# Patient Record
Sex: FEMALE | Race: WHITE | Employment: PART TIME | URBAN - METROPOLITAN AREA
[De-identification: names, ages, dates, MRNs, and addresses within clinical notes are randomized per-mention and may not be internally consistent; named-entity substitution may affect disease eponyms.]

---

## 2020-09-18 ENCOUNTER — APPOINTMENT (OUTPATIENT)
Dept: GENERAL RADIOLOGY | Age: 60
End: 2020-09-18
Payer: COMMERCIAL

## 2020-09-18 ENCOUNTER — HOSPITAL ENCOUNTER (EMERGENCY)
Age: 60
Discharge: HOME | End: 2020-09-18
Attending: EMERGENCY MEDICINE
Payer: COMMERCIAL

## 2020-09-18 VITALS
TEMPERATURE: 97.5 F | OXYGEN SATURATION: 99 % | RESPIRATION RATE: 16 BRPM | SYSTOLIC BLOOD PRESSURE: 106 MMHG | HEART RATE: 92 BPM | DIASTOLIC BLOOD PRESSURE: 77 MMHG

## 2020-09-18 PROCEDURE — 73630 X-RAY EXAM OF FOOT: CPT

## 2020-09-18 PROCEDURE — 6370000000 HC RX 637 (ALT 250 FOR IP): Performed by: EMERGENCY MEDICINE

## 2020-09-18 PROCEDURE — 99283 EMERGENCY DEPT VISIT LOW MDM: CPT

## 2020-09-18 RX ORDER — SIMVASTATIN 80 MG
80 TABLET ORAL DAILY
COMMUNITY
Start: 2019-06-19

## 2020-09-18 RX ORDER — CLINDAMYCIN HYDROCHLORIDE 300 MG/1
300 CAPSULE ORAL 4 TIMES DAILY
Qty: 28 CAPSULE | Refills: 0 | Status: SHIPPED | OUTPATIENT
Start: 2020-09-18 | End: 2020-09-25

## 2020-09-18 RX ORDER — LISINOPRIL 20 MG/1
20 TABLET ORAL DAILY
COMMUNITY
Start: 2020-06-15

## 2020-09-18 RX ORDER — CLINDAMYCIN HYDROCHLORIDE 150 MG/1
300 CAPSULE ORAL ONCE
Status: COMPLETED | OUTPATIENT
Start: 2020-09-18 | End: 2020-09-18

## 2020-09-18 RX ORDER — HYDROCHLOROTHIAZIDE 25 MG/1
25 TABLET ORAL DAILY
COMMUNITY
Start: 2019-06-18

## 2020-09-18 RX ORDER — CLOPIDOGREL BISULFATE 75 MG/1
75 TABLET ORAL DAILY
COMMUNITY
Start: 2020-06-15

## 2020-09-18 RX ORDER — CIPROFLOXACIN 500 MG/1
500 TABLET, FILM COATED ORAL 2 TIMES DAILY
Qty: 14 TABLET | Refills: 0 | Status: SHIPPED | OUTPATIENT
Start: 2020-09-18 | End: 2020-09-25

## 2020-09-18 RX ORDER — NAPROXEN 500 MG/1
500 TABLET ORAL 2 TIMES DAILY
Qty: 60 TABLET | Refills: 0 | Status: SHIPPED | OUTPATIENT
Start: 2020-09-18

## 2020-09-18 RX ORDER — ASPIRIN 81 MG/1
81 TABLET ORAL DAILY
COMMUNITY

## 2020-09-18 RX ORDER — NAPROXEN 250 MG/1
500 TABLET ORAL ONCE
Status: COMPLETED | OUTPATIENT
Start: 2020-09-18 | End: 2020-09-18

## 2020-09-18 RX ORDER — CIPROFLOXACIN 500 MG/1
500 TABLET, FILM COATED ORAL ONCE
Status: COMPLETED | OUTPATIENT
Start: 2020-09-18 | End: 2020-09-18

## 2020-09-18 RX ORDER — METOPROLOL SUCCINATE 25 MG/1
25 TABLET, EXTENDED RELEASE ORAL DAILY
COMMUNITY
Start: 2020-06-15

## 2020-09-18 RX ADMIN — CIPROFLOXACIN 500 MG: 500 TABLET, FILM COATED ORAL at 22:28

## 2020-09-18 RX ADMIN — CLINDAMYCIN HYDROCHLORIDE 300 MG: 150 CAPSULE ORAL at 22:28

## 2020-09-18 RX ADMIN — NAPROXEN 500 MG: 250 TABLET ORAL at 22:28

## 2020-09-18 ASSESSMENT — PAIN SCALES - GENERAL
PAINLEVEL_OUTOF10: 5
PAINLEVEL_OUTOF10: 5

## 2020-09-18 ASSESSMENT — ENCOUNTER SYMPTOMS: COLOR CHANGE: 1

## 2020-09-19 NOTE — ED PROVIDER NOTES
[facility]  EMERGENCY DEPARTMENTENCOUNTER      Pt Name: Willow Langley  MRN: [id number]  [place] 1960  Date ofevaluation: 9/18/2020  Provider: Jairo Perkins MD    CHIEF COMPLAINT       Chief Complaint   Patient presents with    Foot Pain     Pt reports foot pain for the past two days without injury. No rash/wound. PMS +         HISTORY OF PRESENT ILLNESS   (Location/Symptom, Timing/Onset,Context/Setting, Quality, Duration, Modifying Factors, Severity)  Note limiting factors. Willow Langley is a 64 y.o. female  who  has a past medical history of Aneurysm ([facility] [address] Cerebral artery occlusion with cerebral infarction ([facility] [address] Hyperlipidemia, and Hypertension. who presents to the emergency department for evaluation of left foot pain. Patient reports a spontaneous initiation of pain the plantar surface of her foot between the first and second digits. She states it is worse with positioning and movement and weightbearing. She has not taken medications for her symptoms. She denies a history of trauma injury or puncture. Denies a history of previous. Denies swelling at the proximal joint or pain with flexion or extension of the first digit. Denies ankle pain. HPI    NursingNotes were reviewed. REVIEW OF SYSTEMS    (2-9 systems for level 4, 10 or more for level 5)     Review of Systems   Constitutional: Negative for chills, diaphoresis and fever. Musculoskeletal: Positive for myalgias. Negative for arthralgias. Skin: Positive for color change. Negative for wound. All other systems reviewed and are negative. Except as noted above the remainder of the review of systems was reviewed and negative. PAST MEDICAL HISTORY     Past Medical History:   Diagnosis Date    Aneurysm [facility])     Cerebral artery occlusion with cerebral infarction ([facility] [address]     Hyperlipidemia     Hypertension          SURGICALHISTORY     History reviewed.  No pertinent surgical Spontaneous  Best Verbal Response: Oriented  Best Motor Response: Obeys commands  Tip Coma Scale Score: 15        PHYSICAL EXAM    (up to 7 for level 4, 8 or more for level 5)     ED Triage Vitals [09/18/20 2015]   BP Temp Temp Source Pulse Resp SpO2 Height Weight   (!) 137/95 97.5 °F (36.4 °C) Oral 100 16 99 % -- --       Physical Exam  Vitals signs and nursing note reviewed. Constitutional:       Appearance: She is well-developed. HENT:      Head: Normocephalic and atraumatic. Eyes:      Conjunctiva/sclera: Conjunctivae normal.      Pupils: Pupils are equal, round, and reactive to light. Neck:      Musculoskeletal: Normal range of motion. Trachea: No tracheal deviation. Cardiovascular:      Rate and Rhythm: Normal rate and regular rhythm. Heart sounds: Normal heart sounds. Pulmonary:      Effort: Pulmonary effort is normal.      Breath sounds: Normal breath sounds. Abdominal:      General: There is no distension. Palpations: Abdomen is soft. Tenderness: There is no abdominal tenderness. Musculoskeletal: Normal range of motion. General: Swelling and tenderness present. No deformity or signs of injury. Left lower leg: No edema. Feet:    Skin:     General: Skin is warm and dry. Capillary Refill: Capillary refill takes less than 2 seconds. Findings: Erythema present. No rash. Neurological:      Mental Status: She is alert and oriented to person, place, and time. Sensory: No sensory deficit.          RESULTS     EKG: All EKG's are interpreted by the Emergency Department Physician who either signs or Co-signsthis chart in the absence of a cardiologist.      RADIOLOGY:   Non-plain filmimages such as CT, Ultrasound and MRI are read by the radiologist. Plain radiographic images are visualized and preliminarily interpreted by the emergency physician with the below findings:      Interpretation per the Radiologist below, if available at the time ofthis note:    XR FOOT RIGHT (MIN 3 VIEWS)    (Results Pending)         ED BEDSIDE ULTRASOUND:   Performed by ED Physician - none    LABS:  Labs Reviewed - No data to display    All other labs were within normal range or not returned as of this dictation. EMERGENCY DEPARTMENT COURSE and DIFFERENTIAL DIAGNOSIS/MDM:   Vitals:    Vitals:    09/18/20 2015   BP: (!) 137/95   Pulse: 100   Resp: 16   Temp: 97.5 °F (36.4 °C)   TempSrc: Oral   SpO2: 99%       Patient was given thefollowing medications:  Medications - No data to display    ED COURSE & MEDICAL DECISION MAKING    Pertinent Labs & Imaging studies reviewed. (See chart for details)   -  Patient seen and evaluated in the emergency department. -  Triage and nursing notes reviewed and incorporated. -  Old chart records reviewed and incorporated. -  Differential diagnosis includes: Differential diagnosis: Necrotizing fasciitis, cellulitis, erysipelas, suppurative thrombophlebitis, aseptic superficial thrombophlebitis, DVT, gout, compartment syndrome, erythema migrans (lyme disease), contact dermatitis, lymphedema, other    -  Work-up included:  See above  -  ED treatment included: See above  -  Results discussed with patient. Imaging studies show no acute osseous abnormalities. She does have erythema warmth and tenderness to the affected area. Do not appreciate any skin lesions. Patient does not have tenderness at the MTP and has no pain with passive flexion or extension of the toe. Low suspicion for gout. We will treat the patient for cellulitis. She was also instructed in rice therapy. Patient feels improved on reevaluation. Symptomatic treatment with expectant management discussed with the patient and the amenable to treatment plan and outpatient follow-up. Strict return precautions were discussed with the patient. They demonstrated understanding of when to return to the emergency department for new or worsening symptoms. .  The patient is agreeable with plan of care and disposition. REASSESSMENT          CRITICAL CARE TIME   Total Critical Care time was 0 minutes, excluding separatelyreportable procedures. There was a high probability ofclinically significant/life threatening deterioration in the patient's condition which required my urgent intervention. CONSULTS:  None    PROCEDURES:  Unless otherwise noted below, none     Procedures    FINAL IMPRESSION      1. Left foot pain          DISPOSITION/PLAN   DISPOSITION        PATIENT REFERREDTO:  No follow-up provider specified.     DISCHARGEMEDICATIONS:  New Prescriptions    No medications on file          (Please note that portions of this note were completed with a voice recognition program.  Efforts were made to edit the dictations but occasionally words are mis-transcribed.)    Eulalia Lopez MD (electronically signed)  Attending Emergency Physician          Eulalia Loepz MD  09/18/20 [id number]

## 2022-05-18 ENCOUNTER — HOSPITAL ENCOUNTER (OUTPATIENT)
Dept: ULTRASOUND IMAGING | Age: 62
Discharge: HOME | End: 2022-05-18
Payer: COMMERCIAL

## 2022-05-18 DIAGNOSIS — R10.2 PELVIC AND PERINEAL PAIN: ICD-10-CM

## 2022-05-18 PROCEDURE — 76830 TRANSVAGINAL US NON-OB: CPT

## 2022-05-18 PROCEDURE — 76856 US EXAM PELVIC COMPLETE: CPT

## 2022-06-17 ENCOUNTER — HOSPITAL ENCOUNTER (OUTPATIENT)
Dept: CT IMAGING | Age: 62
Discharge: HOME | End: 2022-06-17
Payer: COMMERCIAL

## 2022-06-17 DIAGNOSIS — R10.9 ABDOMINAL PAIN, UNSPECIFIED ABDOMINAL LOCATION: ICD-10-CM

## 2022-06-17 PROCEDURE — 74176 CT ABD & PELVIS W/O CONTRAST: CPT

## 2022-08-22 ENCOUNTER — HOSPITAL ENCOUNTER (OUTPATIENT)
Age: 62
Discharge: HOME | End: 2022-08-22
Payer: COMMERCIAL

## 2022-08-22 ENCOUNTER — HOSPITAL ENCOUNTER (OUTPATIENT)
Dept: GENERAL RADIOLOGY | Age: 62
Discharge: HOME | End: 2022-08-22
Payer: COMMERCIAL

## 2022-08-22 DIAGNOSIS — M41.9 SCOLIOSIS, UNSPECIFIED SCOLIOSIS TYPE, UNSPECIFIED SPINAL REGION: ICD-10-CM

## 2022-08-22 PROCEDURE — 72100 X-RAY EXAM L-S SPINE 2/3 VWS: CPT

## 2022-08-22 PROCEDURE — 72082 X-RAY EXAM ENTIRE SPI 2/3 VW: CPT

## 2022-08-22 PROCEDURE — 73522 X-RAY EXAM HIPS BI 3-4 VIEWS: CPT

## 2023-02-09 ENCOUNTER — HOSPITAL ENCOUNTER (OUTPATIENT)
Dept: WOMENS IMAGING | Age: 63
Discharge: HOME | End: 2023-02-09
Payer: COMMERCIAL

## 2023-02-09 DIAGNOSIS — Z12.31 VISIT FOR SCREENING MAMMOGRAM: ICD-10-CM

## 2023-02-09 PROCEDURE — 77067 SCR MAMMO BI INCL CAD: CPT

## 2023-06-30 ENCOUNTER — HOSPITAL ENCOUNTER (OUTPATIENT)
Dept: PHYSICAL THERAPY | Age: 63
Setting detail: THERAPIES SERIES
Discharge: HOME | End: 2023-06-30
Payer: COMMERCIAL

## 2023-06-30 DIAGNOSIS — Z78.9 DECREASED ACTIVITIES OF DAILY LIVING (ADL): Primary | ICD-10-CM

## 2023-06-30 PROCEDURE — 97110 THERAPEUTIC EXERCISES: CPT

## 2023-06-30 PROCEDURE — 97161 PT EVAL LOW COMPLEX 20 MIN: CPT

## 2023-07-05 ENCOUNTER — HOSPITAL ENCOUNTER (OUTPATIENT)
Dept: PHYSICAL THERAPY | Age: 63
Setting detail: THERAPIES SERIES
Discharge: HOME | End: 2023-07-05
Payer: COMMERCIAL

## 2023-07-05 NOTE — FLOWSHEET NOTE
[address] [facility] and Therapy, [address] [facility] [address]  Phone: [phone]   Fax:     [phone]     Physical Therapy  Cancellation/No-show Note  Patient Name:  Dayday Jon  :  1960   Date:  2023  Cancelled visits to date: 0  No-shows to date: 1    Patient status for today's appointment patient:  []  Cancelled  []  Rescheduled appointment  []  No-show     Reason given by patient:  []  Patient ill  []  Conflicting appointment  []  No transportation    []  Conflict with work  []  No reason given  []  Other:     Comments:        Pt was approved for therapeutic exercise [phone] and neuromuscular re-eduction 11123 thru 23.     Phone call information:   [x]  Phone call made today to patient at _ time at number provided:      []  Patient answered, conversation as follows:    [x]  Patient did not answer, message left as follows: registrar called and left message regarding NS.  []  Phone call not made today    Electronically signed by:  Kojo Lynn, [address] [place] [address] , T-C, [id number]

## 2023-07-07 ENCOUNTER — HOSPITAL ENCOUNTER (OUTPATIENT)
Dept: PHYSICAL THERAPY | Age: 63
Setting detail: THERAPIES SERIES
Discharge: HOME | End: 2023-07-07
Payer: COMMERCIAL

## 2023-07-07 PROCEDURE — 97110 THERAPEUTIC EXERCISES: CPT

## 2023-07-07 PROCEDURE — 97140 MANUAL THERAPY 1/> REGIONS: CPT

## 2023-07-07 NOTE — FLOWSHEET NOTE
[address] [facility] [address] and Therapy, [address] [facility] [address]  Phone: [phone]   Fax:     [phone]    Physical Therapy Treatment Note/ Progress Report:     Date:  2023    Patient Name:  Elvia Grimm    :  1960  MRN: [id number]    Pertinent Medical History:      Referring Provider:   Dr. Brandon Ballesteros                                 Evaluation Date: 2023                                                 Medical Diagnosis:  Scoliosis, unspecified [M41.9]     Treatment Diagnosis:      ICD-10-CM     1. Decreased activities of daily living (ADL)  Z78.9                                      Insurance information: PT Insurance Information:  [facility] signed (Y/N): N    Date of Patient follow up with Physician:      Progress Report: []  Yes  [x]  No     Date Range for reporting period:  Beginnin23  Ending:    Progress report due (10 Rx/or 30 days whichever is less):      Recertification due (POC duration/ or 90 days whichever is less):     Visit # POC/ Insurance Allowable Auth Needed   2 tbd [x]Yes    []No     Pain level:  5/10 central lumbar   Functional Scale: [place]=  25 raw    History of Injury:Patient stated she was recently diagnosed with scoliosis. Pt has central lumbar pain. Pt stated when she shops she depends upon a grocery cart for endurance. Pt estimated standing and walking tolerance 5 minutes.   Easing factors: sitting  Provocative factors: walking, standing, sweeping floor, mopping    SUBJECTIVE:  See eval  : 10' late - Feels the same as always    OBJECTIVE:   Observation:   Test measurements:      RESTRICTIONS/PRECAUTIONS: CVA, HTN, aneurysm     Exercises/Interventions:     Therapeutic Ex (09970)   Min: Repetitions/Resistance Notes/Cues        Nustep 5 min    Gastoc Incline 3x30\"    Hamstring step 2x30\"    LTR 2x30\"    SKTC 2x30\"    Piriformis str 2x30\"    Knee

## 2023-07-12 ENCOUNTER — HOSPITAL ENCOUNTER (OUTPATIENT)
Dept: PHYSICAL THERAPY | Age: 63
Setting detail: THERAPIES SERIES
Discharge: HOME | End: 2023-07-12
Payer: COMMERCIAL

## 2023-07-12 PROCEDURE — 97112 NEUROMUSCULAR REEDUCATION: CPT

## 2023-07-12 PROCEDURE — 97530 THERAPEUTIC ACTIVITIES: CPT

## 2023-07-12 PROCEDURE — 97110 THERAPEUTIC EXERCISES: CPT

## 2023-07-12 NOTE — FLOWSHEET NOTE
increased symptoms or restriction. [] Progressing: [] Met: [] Not Met: [] Adjusted  5. Patient will be able to walk 1 mile. [] Progressing: [] Met: [] Not Met: [] Adjusted         ASSESSMENT:  See eval    Treatment/Activity Tolerance:  [x] Patient tolerated treatment well [] Patient limited by fatique  [] Patient limited by pain  [] Patient limited by other medical complications  [] Other:     Overall Progression Towards Functional goals/ Treatment Progress Update:  [] Patient is progressing as expected towards functional goals listed. [] Progression is slowed due to complexities/Impairments listed. [] Progression has been slowed due to co-morbidities. [x] Plan just implemented, too soon to assess goals progression <30days   [] Goals require adjustment due to lack of progress  [] Patient is not progressing as expected and requires additional follow up with physician  [] Other:    Prognosis for POC: [x] Good [] Fair  [] Poor    Patient requires continued skilled intervention: [x] Yes  [] No        PLAN: Begin STM lumbar, focus stretching and remedial trunk strengthening  [x] Continue per plan of care [] Alter current plan (see comments)  [] Plan of care initiated [] Hold pending MD visit [] Discharge    Electronically signed by: Donnelly Councilman, PT   Note: If patient does not return for scheduled/recommended follow up visits, this note will serve as a discharge from care along with the most recent update on progress.

## 2023-07-19 ENCOUNTER — HOSPITAL ENCOUNTER (OUTPATIENT)
Dept: PHYSICAL THERAPY | Age: 63
Setting detail: THERAPIES SERIES
Discharge: HOME | End: 2023-07-19
Payer: COMMERCIAL

## 2023-07-19 NOTE — FLOWSHEET NOTE
[address] [facility] and Therapy, [address] [place] [address]. [facility] [address]  Phone: [phone]   Fax:     [phone]     Physical Therapy  Cancellation/No-show Note  Patient Name:  Becky Hector  :  1960   Date:  2023  Cancelled visits to date: 1  No-shows to date: 0    Patient status for today's appointment patient:  [x]  Cancelled  []  Rescheduled appointment  []  No-show     Reason given by patient:  []  Patient ill  []  Conflicting appointment  []  No transportation    []  Conflict with work  []  No reason given  [x]  Other:     Comments:  Patient called 35 min after her appt time to say she would be late.  She was instructed to cancel and reschedule her appt    Phone call information:   []  Phone call made today to patient at _ time at number provided:      []  Patient answered, conversation as follows:    []  Patient did not answer, message left as follows:  []  Phone call not made today    Electronically signed by:  Hadley Diez PTA

## 2023-07-27 ENCOUNTER — HOSPITAL ENCOUNTER (OUTPATIENT)
Dept: PHYSICAL THERAPY | Age: 63
Setting detail: THERAPIES SERIES
Discharge: HOME | End: 2023-07-27
Payer: COMMERCIAL

## 2023-07-27 PROCEDURE — 97530 THERAPEUTIC ACTIVITIES: CPT

## 2023-07-27 PROCEDURE — 97110 THERAPEUTIC EXERCISES: CPT

## 2023-07-27 PROCEDURE — 97112 NEUROMUSCULAR REEDUCATION: CPT

## 2023-07-27 NOTE — FLOWSHEET NOTE
[address] [facility] [employer] and Therapy, [address] [facility]. [place] [address]  Phone: [phone]   Fax:     [phone]    Physical Therapy Treatment Note/ Progress Report:     Date:  2023    Patient Name:  Nika Wolf    :  1960  MRN: [id number]    Pertinent Medical History:      Referring Provider:   Dr. Sharren Habermann                                 Evaluation Date: 2023                                                 Medical Diagnosis:  Scoliosis, unspecified [M41.9]     Treatment Diagnosis:      ICD-10-CM     1. Decreased activities of daily living (ADL)  Z78.9                                      Insurance information: PT Insurance Information:  [facility] signed (Y/N): Y    Date of Patient follow up with Physician:      Progress Report: []  Yes  [x]  No     Date Range for reporting period:  Beginnin23  Ending:    Progress report due (10 Rx/or 30 days whichever is less):      Recertification due (POC duration/ or 90 days whichever is less):     Visit # POC/ Insurance Allowable Auth Needed   4 Approved thru 23 for [phone], [id number] [phone] [x]Yes    []No     Pain level:  5/10 central lumbar   Functional Scale: [place]=  23 25 raw; 23 28 raw    History of Injury:Patient stated she was recently diagnosed with scoliosis. Pt has central lumbar pain. Pt stated when she shops she depends upon a grocery cart for endurance. Pt estimated standing and walking tolerance 5 minutes. Easing factors: sitting  Provocative factors: walking, standing, sweeping floor, mopping    SUBJECTIVE:  See eval  : 10' late - Feels the same as always  23 7' late. Sore following last session  23 12' late \"its helping it a lot, its not as bad as it was. \"  Pt stated she can walk better without having a cart when she is shopping.     OBJECTIVE:   Observation:   23  Joint mobility:

## 2023-08-03 ENCOUNTER — HOSPITAL ENCOUNTER (OUTPATIENT)
Dept: PHYSICAL THERAPY | Age: 63
Setting detail: THERAPIES SERIES
Discharge: HOME | End: 2023-08-03

## 2023-08-03 NOTE — FLOWSHEET NOTE
[address] [facility] and Therapy, [address] [place] [address]  Phone: [phone]   Fax:     [phone]     Physical Therapy  Cancellation/No-show Note  Patient Name:  Dafne Singletary  :  1960   Date:  8/3/2023  Cancelled visits to date: 1  No-shows to date: 1    Patient status for today's appointment patient:  []  Cancelled  []  Rescheduled appointment  [x]  No-show     Reason given by patient:  []  Patient ill  []  Conflicting appointment  []  No transportation    []  Conflict with work  []  No reason given  []  Other:     Comments:      Phone call information:   []  Phone call made today to patient at _ time at number provided:      []  Patient answered, conversation as follows:    []  Patient did not answer, message left as follows:  [x]  Phone call not made today - No more appts scheduled    Electronically signed by:  Bulmaro Jacobson, PTA [id number]

## 2024-08-30 ENCOUNTER — TELEPHONE (OUTPATIENT)
Dept: ENDOCRINOLOGY | Age: 64
End: 2024-08-30